# Patient Record
Sex: MALE | Race: WHITE | NOT HISPANIC OR LATINO | Employment: OTHER | URBAN - METROPOLITAN AREA
[De-identification: names, ages, dates, MRNs, and addresses within clinical notes are randomized per-mention and may not be internally consistent; named-entity substitution may affect disease eponyms.]

---

## 2018-06-04 ENCOUNTER — APPOINTMENT (OUTPATIENT)
Dept: RADIOLOGY | Facility: MEDICAL CENTER | Age: 68
End: 2018-06-04
Attending: EMERGENCY MEDICINE
Payer: COMMERCIAL

## 2018-06-04 ENCOUNTER — HOSPITAL ENCOUNTER (EMERGENCY)
Facility: MEDICAL CENTER | Age: 68
End: 2018-06-04
Attending: EMERGENCY MEDICINE
Payer: COMMERCIAL

## 2018-06-04 VITALS
SYSTOLIC BLOOD PRESSURE: 140 MMHG | BODY MASS INDEX: 28.79 KG/M2 | TEMPERATURE: 97.5 F | WEIGHT: 190 LBS | HEART RATE: 80 BPM | HEIGHT: 68 IN | RESPIRATION RATE: 18 BRPM | OXYGEN SATURATION: 89 % | DIASTOLIC BLOOD PRESSURE: 63 MMHG

## 2018-06-04 DIAGNOSIS — R10.13 EPIGASTRIC PAIN: ICD-10-CM

## 2018-06-04 DIAGNOSIS — K43.9 VENTRAL HERNIA WITHOUT OBSTRUCTION OR GANGRENE: ICD-10-CM

## 2018-06-04 LAB
ALBUMIN SERPL BCP-MCNC: 4 G/DL (ref 3.2–4.9)
ALBUMIN/GLOB SERPL: 1.2 G/DL
ALP SERPL-CCNC: 54 U/L (ref 30–99)
ALT SERPL-CCNC: 39 U/L (ref 2–50)
ANION GAP SERPL CALC-SCNC: 8 MMOL/L (ref 0–11.9)
AST SERPL-CCNC: 34 U/L (ref 12–45)
BASOPHILS # BLD AUTO: 0.8 % (ref 0–1.8)
BASOPHILS # BLD: 0.05 K/UL (ref 0–0.12)
BILIRUB SERPL-MCNC: 0.4 MG/DL (ref 0.1–1.5)
BUN SERPL-MCNC: 21 MG/DL (ref 8–22)
CALCIUM SERPL-MCNC: 9.3 MG/DL (ref 8.5–10.5)
CHLORIDE SERPL-SCNC: 105 MMOL/L (ref 96–112)
CO2 SERPL-SCNC: 22 MMOL/L (ref 20–33)
CREAT SERPL-MCNC: 1.09 MG/DL (ref 0.5–1.4)
EOSINOPHIL # BLD AUTO: 0.27 K/UL (ref 0–0.51)
EOSINOPHIL NFR BLD: 4.5 % (ref 0–6.9)
ERYTHROCYTE [DISTWIDTH] IN BLOOD BY AUTOMATED COUNT: 53.1 FL (ref 35.9–50)
GLOBULIN SER CALC-MCNC: 3.4 G/DL (ref 1.9–3.5)
GLUCOSE SERPL-MCNC: 102 MG/DL (ref 65–99)
HCT VFR BLD AUTO: 30.4 % (ref 42–52)
HGB BLD-MCNC: 10.2 G/DL (ref 14–18)
IMM GRANULOCYTES # BLD AUTO: 0.01 K/UL (ref 0–0.11)
IMM GRANULOCYTES NFR BLD AUTO: 0.2 % (ref 0–0.9)
LIPASE SERPL-CCNC: 48 U/L (ref 11–82)
LYMPHOCYTES # BLD AUTO: 2.27 K/UL (ref 1–4.8)
LYMPHOCYTES NFR BLD: 38.2 % (ref 22–41)
MCH RBC QN AUTO: 31.5 PG (ref 27–33)
MCHC RBC AUTO-ENTMCNC: 33.6 G/DL (ref 33.7–35.3)
MCV RBC AUTO: 93.8 FL (ref 81.4–97.8)
MONOCYTES # BLD AUTO: 0.56 K/UL (ref 0–0.85)
MONOCYTES NFR BLD AUTO: 9.4 % (ref 0–13.4)
NEUTROPHILS # BLD AUTO: 2.78 K/UL (ref 1.82–7.42)
NEUTROPHILS NFR BLD: 46.9 % (ref 44–72)
NRBC # BLD AUTO: 0 K/UL
NRBC BLD-RTO: 0 /100 WBC
PLATELET # BLD AUTO: 202 K/UL (ref 164–446)
PMV BLD AUTO: 9.8 FL (ref 9–12.9)
POTASSIUM SERPL-SCNC: 4.2 MMOL/L (ref 3.6–5.5)
PROT SERPL-MCNC: 7.4 G/DL (ref 6–8.2)
RBC # BLD AUTO: 3.24 M/UL (ref 4.7–6.1)
SODIUM SERPL-SCNC: 135 MMOL/L (ref 135–145)
WBC # BLD AUTO: 5.9 K/UL (ref 4.8–10.8)

## 2018-06-04 PROCEDURE — 99284 EMERGENCY DEPT VISIT MOD MDM: CPT

## 2018-06-04 PROCEDURE — 85025 COMPLETE CBC W/AUTO DIFF WBC: CPT

## 2018-06-04 PROCEDURE — 80053 COMPREHEN METABOLIC PANEL: CPT

## 2018-06-04 PROCEDURE — 700105 HCHG RX REV CODE 258: Performed by: EMERGENCY MEDICINE

## 2018-06-04 PROCEDURE — 36415 COLL VENOUS BLD VENIPUNCTURE: CPT

## 2018-06-04 PROCEDURE — 74177 CT ABD & PELVIS W/CONTRAST: CPT

## 2018-06-04 PROCEDURE — 700117 HCHG RX CONTRAST REV CODE 255: Performed by: EMERGENCY MEDICINE

## 2018-06-04 PROCEDURE — 83690 ASSAY OF LIPASE: CPT

## 2018-06-04 RX ORDER — LYSINE HCL 500 MG
500 TABLET ORAL DAILY
COMMUNITY

## 2018-06-04 RX ORDER — SODIUM PHOSPHATE,MONO-DIBASIC 19G-7G/118
500 ENEMA (ML) RECTAL
COMMUNITY

## 2018-06-04 RX ORDER — CYANOCOBALAMIN 1000 UG/ML
1000 INJECTION, SOLUTION INTRAMUSCULAR; SUBCUTANEOUS
COMMUNITY

## 2018-06-04 RX ORDER — ASPIRIN 81 MG/1
81 TABLET, CHEWABLE ORAL DAILY
COMMUNITY

## 2018-06-04 RX ORDER — CHLORAL HYDRATE 500 MG
1000 CAPSULE ORAL
COMMUNITY

## 2018-06-04 RX ORDER — ASCORBIC ACID 500 MG
1000 TABLET ORAL DAILY
COMMUNITY

## 2018-06-04 RX ORDER — LISINOPRIL 10 MG/1
10 TABLET ORAL DAILY
COMMUNITY

## 2018-06-04 RX ORDER — GUAIFENESIN 200 MG/1
TABLET ORAL
COMMUNITY

## 2018-06-04 RX ORDER — OMEPRAZOLE 20 MG/1
20 CAPSULE, DELAYED RELEASE ORAL DAILY
COMMUNITY

## 2018-06-04 RX ORDER — SODIUM CHLORIDE 9 MG/ML
1000 INJECTION, SOLUTION INTRAVENOUS ONCE
Status: COMPLETED | OUTPATIENT
Start: 2018-06-04 | End: 2018-06-04

## 2018-06-04 RX ORDER — GUAIFENESIN 600 MG/1
600 TABLET, EXTENDED RELEASE ORAL EVERY 12 HOURS
COMMUNITY

## 2018-06-04 RX ORDER — TAMSULOSIN HYDROCHLORIDE 0.4 MG/1
0.4 CAPSULE ORAL
COMMUNITY

## 2018-06-04 RX ADMIN — IOHEXOL 75 ML: 350 INJECTION, SOLUTION INTRAVENOUS at 14:02

## 2018-06-04 RX ADMIN — SODIUM CHLORIDE 1000 ML: 9 INJECTION, SOLUTION INTRAVENOUS at 12:55

## 2018-06-04 ASSESSMENT — ENCOUNTER SYMPTOMS
ABDOMINAL PAIN: 1
VOMITING: 0
DIARRHEA: 0
NAUSEA: 0
FEVER: 0

## 2018-06-04 ASSESSMENT — PAIN SCALES - GENERAL
PAINLEVEL_OUTOF10: 7
PAINLEVEL_OUTOF10: 5

## 2018-06-04 NOTE — ED PROVIDER NOTES
ED Provider Note    Scribed for Gilbert Rendon M.D. by Michael Graff. 6/4/2018, 12:22 PM.    Primary care provider: No primary care provider on file.  Means of arrival: walk in  History obtained from: patient  History limited by: none    CHIEF COMPLAINT  Chief Complaint   Patient presents with   • Hernia     States worsening hernia pain; states no previous surgery however multiple reductions.        HPI  Dante Bains is a 68 y.o. male who presents to the Emergency Department complaining of gradually worsening epigastric abdominal pain for the last 2 weeks secondary to hernia. Patient describes his pain as stinging and burning. He is a [facility] from [facility] that now lives in [place], [place]. Patient has had numerous reductions, but has not been scheduled for a surgical procedure to fix his hernia. He states that he presents today for evaluation and assistance in obtaining more definitive care. Patient denies nausea, vomiting, diarrhea, fever.     REVIEW OF SYSTEMS  Review of Systems   Constitutional: Negative for fever.   Gastrointestinal: Positive for abdominal pain. Negative for diarrhea, nausea and vomiting.   All other systems reviewed and are negative.  C.    PAST MEDICAL HISTORY   has a past medical history of Hepatitis C and Hypertension.    SURGICAL HISTORY  patient denies any surgical history    SOCIAL HISTORY  Social History   Substance Use Topics   • Smoking status: Current Every Day Smoker     Packs/day: 0.25   • Smokeless tobacco: Never Used   • Alcohol use No      History   Drug Use No       FAMILY HISTORY  History reviewed. No pertinent family history.    CURRENT MEDICATIONS  Home Medications     Reviewed by Reema Levi R.N. (Registered Nurse) on 06/04/18 at 1213  Med List Status: Partial   Medication Last Dose Status   ascorbic acid (ASCORBIC ACID) 500 MG Tab  Active   aspirin (ASA) 81 MG Chew Tab chewable tablet  Active   cyanocobalamin (VITAMIN B-12) 1000 MCG/ML Solution  Active  "  glucosamine Sulfate 500 MG Cap  Active   guaifenesin 200 MG tablet  Active   guaiFENesin LA (MUCINEX) 600 MG TABLET SR 12 HR  Active   lisinopril (PRINIVIL) 10 MG Tab  Active   lysine 500 MG Tab  Active   MAGNESIUM PO  Active   multivitamin (THERAGRAN) Tab  Active   Omega-3 Fatty Acids (FISH OIL) 1000 MG Cap capsule  Active   omeprazole (PRILOSEC) 20 MG delayed-release capsule  Active   tamsulosin (FLOMAX) 0.4 MG capsule  Active   vitamin D (CHOLECALCIFEROL) 1000 UNIT Tab  Active                ALLERGIES  No Known Allergies    PHYSICAL EXAM  VITAL SIGNS: /63   Pulse 90   Temp 36.4 °C (97.5 °F)   Resp 18   Ht 1.727 m (5' 8\")   Wt 86.2 kg (190 lb)   SpO2 96%   BMI 28.89 kg/m²     Constitutional: Well developed, Well nourished, No acute distress, Non-toxic appearance.   HENT: Normocephalic, Atraumatic, Bilateral external ears normal, oropharynx dry, No oral exudates, Nose normal.   Eyes:conjunctiva is normal, there are no signs of exudate.   Neck: Supple, no meningeal signs.  Lymphatic: No lymphadenopathy noted.   Cardiovascular: Regular rate and rhythm without murmurs gallops or rubs.   Thorax & Lungs: No respiratory distress. Breathing comfortably. Lungs are clear to auscultation bilaterally, there are no wheezes no rales. Chest wall is nontender.  Abdomen: Soft, nondistended. Bowel sounds are present. Ventral hernia that is easily reduced. .   Skin: Warm, Dry, No erythema, Right upper quadrant and periumbilical tenderness  Back: No tenderness, No CVA tenderness.  Musculoskeletal: Good range of motion in all major joints. No tenderness to palpation or major deformities noted. Intact distal pulses, no clubbing, no cyanosis, no edema,   Neurologic: Alert & oriented x 3, Moving all extremities. No gross abnormalities.    Psychiatric: Affect normal, Judgment normal, Mood normal.     LABS  Results for orders placed or performed during the hospital encounter of 06/04/18   CBC WITH DIFFERENTIAL   Result Value " Ref Range    WBC 5.9 4.8 - 10.8 K/uL    RBC 3.24 (L) 4.70 - 6.10 M/uL    Hemoglobin 10.2 (L) 14.0 - 18.0 g/dL    Hematocrit 30.4 (L) 42.0 - 52.0 %    MCV 93.8 81.4 - 97.8 fL    MCH 31.5 27.0 - 33.0 pg    MCHC 33.6 (L) 33.7 - 35.3 g/dL    RDW 53.1 (H) 35.9 - 50.0 fL    Platelet Count 202 164 - 446 K/uL    MPV 9.8 9.0 - 12.9 fL    Neutrophils-Polys 46.90 44.00 - 72.00 %    Lymphocytes 38.20 22.00 - 41.00 %    Monocytes 9.40 0.00 - 13.40 %    Eosinophils 4.50 0.00 - 6.90 %    Basophils 0.80 0.00 - 1.80 %    Immature Granulocytes 0.20 0.00 - 0.90 %    Nucleated RBC 0.00 /100 WBC    Neutrophils (Absolute) 2.78 1.82 - 7.42 K/uL    Lymphs (Absolute) 2.27 1.00 - 4.80 K/uL    Monos (Absolute) 0.56 0.00 - 0.85 K/uL    Eos (Absolute) 0.27 0.00 - 0.51 K/uL    Baso (Absolute) 0.05 0.00 - 0.12 K/uL    Immature Granulocytes (abs) 0.01 0.00 - 0.11 K/uL    NRBC (Absolute) 0.00 K/uL   COMP METABOLIC PANEL   Result Value Ref Range    Sodium 135 135 - 145 mmol/L    Potassium 4.2 3.6 - 5.5 mmol/L    Chloride 105 96 - 112 mmol/L    Co2 22 20 - 33 mmol/L    Anion Gap 8.0 0.0 - 11.9    Glucose 102 (H) 65 - 99 mg/dL    Bun 21 8 - 22 mg/dL    Creatinine 1.09 0.50 - 1.40 mg/dL    Calcium 9.3 8.5 - 10.5 mg/dL    AST(SGOT) 34 12 - 45 U/L    ALT(SGPT) 39 2 - 50 U/L    Alkaline Phosphatase 54 30 - 99 U/L    Total Bilirubin 0.4 0.1 - 1.5 mg/dL    Albumin 4.0 3.2 - 4.9 g/dL    Total Protein 7.4 6.0 - 8.2 g/dL    Globulin 3.4 1.9 - 3.5 g/dL    A-G Ratio 1.2 g/dL   LIPASE   Result Value Ref Range    Lipase 48 11 - 82 U/L   ESTIMATED GFR   Result Value Ref Range    GFR If African American >60 >60 mL/min/1.73 m 2    GFR If Non African American >60 >60 mL/min/1.73 m 2   All labs reviewed by me.    RADIOLOGY  CT-ABDOMEN-PELVIS WITH   Final Result      Upper ventral abdominal wall hernia containing fat.   No evidence of bowel obstruction or acute appendicitis. No free fluid.   No hydronephrosis.   Mild upper abdomen adenopathy.   Atherosclerotic changes  including coronary artery calcifications.      The radiologist's interpretation of all radiological studies have been reviewed by me.    COURSE & MEDICAL DECISION MAKING  Pertinent Labs & Imaging studies reviewed. (See chart for details)    12:22 PM - Patient seen and examined at bedside. I discussed the acute nature of the emergency room. Patient states that he has not been referred to a surgeon in the past for his hernia. I can feel his frustration in the matter and lack of progress towards definitive care. Because I have no information of his chronic condition, I discussed evaluation in the ED with CAT scan and basic labs and subsequent referral to a surgeon. Patient will be treated with NS 1000 ml for dehydration. Ordered CT abdomen pelvis, CBC with differential, CMP, Lipase, Urinalysis culture to evaluate his symptoms. The differential diagnoses include but are not limited to: ventral hernia     4:48 PM , patient is feeling better after the fluid bolus.     Decision Making:  Patient presents today with epigastric pain secondary to chornic ventral hernia. He has had numerous reductions, but has not been referred to a surgeon for elective repair. Patient is a transplant from [place] to [place], [place] who presents today for more definitive care of his chronic condition. I discussed at length the process of obtaining a referral for outpatient, elective surgery for his condition. It sounds as if the patient has not gotten good information on this in the past. He was evaluated and foundAt this point, I do feel is just painful ventral hernias causing discomfort. Patient is slightly anemic at this time. The patient has never been told is been anemic. I recommended for follow-up primary care for further evaluation of this. If given the patient numbers for multiple surgery groups. Dr. Beverly is on today for outpatient follow-up for his hernia for potential repair. At this point, I feel the patient is stable for discharge..      The patient will return for new or worsening symptoms and is stable at the time of discharge.    The patient is referred to a primary physician for blood pressure management, diabetic screening, and for all other preventative health concerns.    DISPOSITION:  Patient will be discharged home in stable condition.    FOLLOW UP:  Pam Beverly M.D.  [address]  [address]  [place] [address]  [phone]          [facility]  [address]  [place] [address]  [phone]          [facility]  [place] [address]  [phone]          [facility]  [address]  [place] [address]  [phone]          OUTPATIENT MEDICATIONS:  Discharge Medication List as of 6/4/2018  4:10 PM             FINAL IMPRESSION  1. Ventral hernia without obstruction or gangrene    2. Epigastric pain          Michael RENTERIA (Scribe), am scribing for, and in the presence of, Gilbert Rendon M.D..    Electronically signed by: Michael Graff (Scribe), 6/4/2018    IGilbert M.D. personally performed the services described in this documentation, as scribed by Michael Graff in my presence, and it is both accurate and complete.    The note accurately reflects work and decisions made by me.  Gilbert Rendon  6/4/2018  4:49 PM

## 2018-06-04 NOTE — DISCHARGE INSTRUCTIONS
Abdominal Pain, Adult  Many things can cause belly (abdominal) pain. Most times, belly pain is not dangerous. Many cases of belly pain can be watched and treated at home. Sometimes belly pain is serious, though. Your doctor will try to find the cause of your belly pain.  Follow these instructions at home:  · Take over-the-counter and prescription medicines only as told by your doctor. Do not take medicines that help you poop (laxatives) unless told to by your doctor.  · Drink enough fluid to keep your pee (urine) clear or pale yellow.  · Watch your belly pain for any changes.  · Keep all follow-up visits as told by your doctor. This is important.  Contact a doctor if:  · Your belly pain changes or gets worse.  · You are not hungry, or you lose weight without trying.  · You are having trouble pooping (constipated) or have watery poop (diarrhea) for more than 2-3 days.  · You have pain when you pee or poop.  · Your belly pain wakes you up at night.  · Your pain gets worse with meals, after eating, or with certain foods.  · You are throwing up and cannot keep anything down.  · You have a fever.  Get help right away if:  · Your pain does not go away as soon as your doctor says it should.  · You cannot stop throwing up.  · Your pain is only in areas of your belly, such as the right side or the left lower part of the belly.  · You have bloody or black poop, or poop that looks like tar.  · You have very bad pain, cramping, or bloating in your belly.  · You have signs of not having enough fluid or water in your body (dehydration), such as:  ¨ Dark pee, very little pee, or no pee.  ¨ Cracked lips.  ¨ Dry mouth.  ¨ Sunken eyes.  ¨ Sleepiness.  ¨ Weakness.  This information is not intended to replace advice given to you by your health care provider. Make sure you discuss any questions you have with your health care provider.  Document Released: 06/05/2009 Document Revised: 07/07/2017 Document Reviewed: 05/31/2017  [employer]  Interactive Patient Education © 2017 [employer]      Ventral Hernia  A ventral hernia is a bulge of tissue from inside the abdomen that pushes through a weak area of the muscles that form the front wall of the abdomen. The tissues inside the abdomen are inside a sac (peritoneum). These tissues include the small intestine, large intestine, and the fatty tissue that covers the intestines (omentum). Sometimes, the bulge that forms a hernia contains intestines. Other hernias contain only fat. Ventral hernias do not go away without surgical treatment.  There are several types of ventral hernias. You may have:  · A hernia at an incision site from previous abdominal surgery (incisional hernia).  · A hernia just above the belly button (epigastric hernia), or at the belly button (umbilical hernia). These types of hernias can develop from heavy lifting or straining.  · A hernia that comes and goes (reducible hernia). It may be visible only when you lift or strain. This type of hernia can be pushed back into the abdomen (reduced).  · A hernia that traps abdominal tissue inside the hernia (incarcerated hernia). This type of hernia does not reduce.  · A hernia that cuts off blood flow to the tissues inside the hernia (strangulated hernia). The tissues can start to die if this happens. This is a very painful bulge that cannot be reduced. A strangulated hernia is a medical emergency.  What are the causes?  This condition is caused by abdominal tissue putting pressure on an area of weakness in the abdominal muscles.  What increases the risk?  The following factors may make you more likely to develop this condition:  · Being male.  · Being 60 or older.  · Being overweight or obese.  · Having had previous abdominal surgery, especially if there was an infection after surgery.  · Having had an injury to the abdominal wall.  · Having had several pregnancies.  · Having a buildup of fluid inside the abdomen (ascites).  What are the  signs or symptoms?  The only symptom of a ventral hernia may be a painless bulge in the abdomen. A reducible hernia may be visible only when you strain, cough, or lift. Other symptoms may include:  · Dull pain.  · A feeling of pressure.  Signs and symptoms of a strangulated hernia may include:  · Increasing pain.  · Nausea and vomiting.  · Pain when pressing on the hernia.  · The skin over the hernia turning red or purple.  · Constipation.  · Blood in the stool (feces).  How is this diagnosed?  This condition may be diagnosed based on:  · Your symptoms.  · Your medical history.  · A physical exam. You may be asked to cough or strain while standing. These actions increase the pressure inside your abdomen and force the hernia through the opening in your muscles. Your health care provider may try to reduce the hernia by pressing on it.  · Imaging studies, such as an ultrasound or CT scan.  How is this treated?  This condition is treated with surgery. If you have a strangulated hernia, surgery is done as soon as possible. If your hernia is small and not incarcerated, you may be asked to lose some weight before surgery.  Follow these instructions at home:  · Follow instructions from your health care provider about eating or drinking restrictions.  · If you are overweight, your health care provider may recommend that you increase your activity level and eat a healthier diet.  · Do not lift anything that is heavier than 10 lb (4.5 kg).  · Return to your normal activities as told by your health care provider. Ask your health care provider what activities are safe for you. You may need to avoid activities that increase pressure on your hernia.  · Take over-the-counter and prescription medicines only as told by your health care provider.  · Keep all follow-up visits as told by your health care provider. This is important.  Contact a health care provider if:  · Your hernia gets larger.  · Your hernia becomes painful.  Get help  right away if:  · Your hernia becomes increasingly painful.  · You have pain along with any of the following:  ¨ Changes in skin color in the area of the hernia.  ¨ Nausea.  ¨ Vomiting.  ¨ Fever.  Summary  · A ventral hernia is a bulge of tissue from inside the abdomen that pushes through a weak area of the muscles that form the front wall of the abdomen.  · This condition is treated with surgery, which may be urgent depending on your hernia.  · Do not lift anything that is heavier than 10 lb (4.5 kg), and follow activity instructions from your health care provider.  This information is not intended to replace advice given to you by your health care provider. Make sure you discuss any questions you have with your health care provider.  Document Released: 12/04/2013 Document Revised: 08/04/2017 Document Reviewed: 08/04/2017  [employer] Interactive Patient Education © 2017 [employer]

## 2018-06-04 NOTE — ED NOTES
Pt to yellow 60, provided with gown to wear.   Here for multiple abdominal hernias which he had for years but now hurting more.

## 2018-06-04 NOTE — ED NOTES
"Encouraged patient to urinate, patient stated he does not \"feel the urge to go\" informed patient that we are waiting on UA to determine discharge status.   "

## 2018-06-04 NOTE — ED TRIAGE NOTES
"Ambulatory with cane with   Chief Complaint   Patient presents with   • Hernia     States worsening hernia pain; states no previous surgery however multiple reductions.    States decreased ability to eat. C/o stinging and burning pain after standing for 15 minutes. Has not been a surgical candidate previously. Drove from [place], [place] hoping \"[facility] would help.\"    "

## 2018-06-04 NOTE — ED NOTES
Patient being discharged home to self care, discussed discharge instructions and answered all questions. VSS